# Patient Record
Sex: FEMALE | Race: OTHER | HISPANIC OR LATINO | URBAN - METROPOLITAN AREA
[De-identification: names, ages, dates, MRNs, and addresses within clinical notes are randomized per-mention and may not be internally consistent; named-entity substitution may affect disease eponyms.]

---

## 2017-04-28 ENCOUNTER — INPATIENT (INPATIENT)
Facility: HOSPITAL | Age: 37
LOS: 2 days | Discharge: HOME | End: 2017-05-01
Attending: OBSTETRICS & GYNECOLOGY | Admitting: OBSTETRICS & GYNECOLOGY
Payer: COMMERCIAL

## 2017-04-28 VITALS — HEIGHT: 61 IN | WEIGHT: 176.37 LBS

## 2017-04-28 DIAGNOSIS — Z34.80 ENCOUNTER FOR SUPERVISION OF OTHER NORMAL PREGNANCY, UNSPECIFIED TRIMESTER: ICD-10-CM

## 2017-04-28 LAB
APTT BLD: 24.2 SEC — LOW (ref 27.5–37.4)
BASOPHILS # BLD AUTO: 0.1 K/UL — SIGNIFICANT CHANGE UP (ref 0–0.2)
BASOPHILS NFR BLD AUTO: 1 % — SIGNIFICANT CHANGE UP (ref 0–2)
EOSINOPHIL # BLD AUTO: 0.1 K/UL — SIGNIFICANT CHANGE UP (ref 0–0.5)
EOSINOPHIL NFR BLD AUTO: 0.7 % — SIGNIFICANT CHANGE UP (ref 0–6)
FIBRINOGEN PPP-MCNC: 669 MG/DL — HIGH (ref 310–510)
HCT VFR BLD CALC: 38.6 % — SIGNIFICANT CHANGE UP (ref 34.5–45)
HGB BLD-MCNC: 12.5 G/DL — SIGNIFICANT CHANGE UP (ref 11.5–15.5)
INR BLD: 0.9 RATIO — SIGNIFICANT CHANGE UP (ref 0.88–1.16)
LYMPHOCYTES # BLD AUTO: 2.8 K/UL — SIGNIFICANT CHANGE UP (ref 1–3.3)
LYMPHOCYTES # BLD AUTO: 22.9 % — SIGNIFICANT CHANGE UP (ref 13–44)
MCHC RBC-ENTMCNC: 29.4 PG — SIGNIFICANT CHANGE UP (ref 27–34)
MCHC RBC-ENTMCNC: 32.4 GM/DL — SIGNIFICANT CHANGE UP (ref 32–36)
MCV RBC AUTO: 90.8 FL — SIGNIFICANT CHANGE UP (ref 80–100)
MONOCYTES # BLD AUTO: 0.9 K/UL — SIGNIFICANT CHANGE UP (ref 0–0.9)
MONOCYTES NFR BLD AUTO: 7.6 % — SIGNIFICANT CHANGE UP (ref 2–14)
NEUTROPHILS # BLD AUTO: 8.2 K/UL — HIGH (ref 1.8–7.4)
NEUTROPHILS NFR BLD AUTO: 67.8 % — SIGNIFICANT CHANGE UP (ref 43–77)
PLATELET # BLD AUTO: 215 K/UL — SIGNIFICANT CHANGE UP (ref 150–400)
PROTHROM AB SERPL-ACNC: 9.8 SEC — SIGNIFICANT CHANGE UP (ref 9.8–12.7)
RBC # BLD: 4.26 M/UL — SIGNIFICANT CHANGE UP (ref 3.8–5.2)
RBC # FLD: 14 % — SIGNIFICANT CHANGE UP (ref 10.3–14.5)
WBC # BLD: 12.1 K/UL — HIGH (ref 3.8–10.5)
WBC # FLD AUTO: 12.1 K/UL — HIGH (ref 3.8–10.5)

## 2017-04-28 RX ORDER — OXYTOCIN 10 UNIT/ML
2 VIAL (ML) INJECTION
Qty: 30 | Refills: 0 | Status: DISCONTINUED | OUTPATIENT
Start: 2017-04-28 | End: 2017-05-01

## 2017-04-28 RX ORDER — SODIUM CHLORIDE 9 MG/ML
1000 INJECTION, SOLUTION INTRAVENOUS ONCE
Qty: 0 | Refills: 0 | Status: DISCONTINUED | OUTPATIENT
Start: 2017-04-28 | End: 2017-04-29

## 2017-04-28 RX ORDER — CITRIC ACID/SODIUM CITRATE 300-500 MG
15 SOLUTION, ORAL ORAL EVERY 4 HOURS
Qty: 0 | Refills: 0 | Status: DISCONTINUED | OUTPATIENT
Start: 2017-04-28 | End: 2017-04-29

## 2017-04-28 RX ORDER — OXYTOCIN 10 UNIT/ML
333.33 VIAL (ML) INJECTION
Qty: 20 | Refills: 0 | Status: DISCONTINUED | OUTPATIENT
Start: 2017-04-28 | End: 2017-04-29

## 2017-04-28 RX ORDER — SODIUM CHLORIDE 9 MG/ML
1000 INJECTION, SOLUTION INTRAVENOUS
Qty: 0 | Refills: 0 | Status: DISCONTINUED | OUTPATIENT
Start: 2017-04-28 | End: 2017-04-29

## 2017-04-28 RX ADMIN — Medication 2 MILLIUNIT(S)/MIN: at 23:51

## 2017-04-28 RX ADMIN — SODIUM CHLORIDE 2000 MILLILITER(S): 9 INJECTION, SOLUTION INTRAVENOUS at 20:40

## 2017-04-29 LAB
BASOPHILS # BLD AUTO: 0.1 K/UL — SIGNIFICANT CHANGE UP (ref 0–0.2)
BASOPHILS NFR BLD AUTO: 0.5 % — SIGNIFICANT CHANGE UP (ref 0–2)
EOSINOPHIL # BLD AUTO: 0.1 K/UL — SIGNIFICANT CHANGE UP (ref 0–0.5)
EOSINOPHIL NFR BLD AUTO: 0.5 % — SIGNIFICANT CHANGE UP (ref 0–6)
HCT VFR BLD CALC: 33.2 % — LOW (ref 34.5–45)
HGB BLD-MCNC: 10.7 G/DL — LOW (ref 11.5–15.5)
LYMPHOCYTES # BLD AUTO: 15.6 % — SIGNIFICANT CHANGE UP (ref 13–44)
LYMPHOCYTES # BLD AUTO: 2.1 K/UL — SIGNIFICANT CHANGE UP (ref 1–3.3)
MCHC RBC-ENTMCNC: 29.3 PG — SIGNIFICANT CHANGE UP (ref 27–34)
MCHC RBC-ENTMCNC: 32.1 GM/DL — SIGNIFICANT CHANGE UP (ref 32–36)
MCV RBC AUTO: 91.1 FL — SIGNIFICANT CHANGE UP (ref 80–100)
MONOCYTES # BLD AUTO: 1.1 K/UL — HIGH (ref 0–0.9)
MONOCYTES NFR BLD AUTO: 8 % — SIGNIFICANT CHANGE UP (ref 2–14)
NEUTROPHILS # BLD AUTO: 10.2 K/UL — HIGH (ref 1.8–7.4)
NEUTROPHILS NFR BLD AUTO: 75.5 % — SIGNIFICANT CHANGE UP (ref 43–77)
PLATELET # BLD AUTO: 184 K/UL — SIGNIFICANT CHANGE UP (ref 150–400)
RBC # BLD: 3.65 M/UL — LOW (ref 3.8–5.2)
RBC # FLD: 14 % — SIGNIFICANT CHANGE UP (ref 10.3–14.5)
T PALLIDUM AB TITR SER: NEGATIVE — SIGNIFICANT CHANGE UP
WBC # BLD: 13.5 K/UL — HIGH (ref 3.8–10.5)
WBC # FLD AUTO: 13.5 K/UL — HIGH (ref 3.8–10.5)

## 2017-04-29 RX ORDER — OXYTOCIN 10 UNIT/ML
41.67 VIAL (ML) INJECTION
Qty: 20 | Refills: 0 | Status: DISCONTINUED | OUTPATIENT
Start: 2017-04-29 | End: 2017-05-01

## 2017-04-29 RX ORDER — TETANUS TOXOID, REDUCED DIPHTHERIA TOXOID AND ACELLULAR PERTUSSIS VACCINE, ADSORBED 5; 2.5; 8; 8; 2.5 [IU]/.5ML; [IU]/.5ML; UG/.5ML; UG/.5ML; UG/.5ML
0.5 SUSPENSION INTRAMUSCULAR ONCE
Qty: 0 | Refills: 0 | Status: DISCONTINUED | OUTPATIENT
Start: 2017-04-29 | End: 2017-05-01

## 2017-04-29 RX ORDER — DOCUSATE SODIUM 100 MG
100 CAPSULE ORAL
Qty: 0 | Refills: 0 | Status: DISCONTINUED | OUTPATIENT
Start: 2017-04-29 | End: 2017-05-01

## 2017-04-29 RX ORDER — ACETAMINOPHEN 500 MG
650 TABLET ORAL EVERY 6 HOURS
Qty: 0 | Refills: 0 | Status: DISCONTINUED | OUTPATIENT
Start: 2017-04-29 | End: 2017-05-01

## 2017-04-29 RX ORDER — SODIUM CHLORIDE 9 MG/ML
3 INJECTION INTRAMUSCULAR; INTRAVENOUS; SUBCUTANEOUS EVERY 8 HOURS
Qty: 0 | Refills: 0 | Status: DISCONTINUED | OUTPATIENT
Start: 2017-04-29 | End: 2017-05-01

## 2017-04-29 RX ORDER — AER TRAVELER 0.5 G/1
1 SOLUTION RECTAL; TOPICAL EVERY 4 HOURS
Qty: 0 | Refills: 0 | Status: DISCONTINUED | OUTPATIENT
Start: 2017-04-29 | End: 2017-05-01

## 2017-04-29 RX ORDER — DIPHENHYDRAMINE HCL 50 MG
25 CAPSULE ORAL EVERY 6 HOURS
Qty: 0 | Refills: 0 | Status: DISCONTINUED | OUTPATIENT
Start: 2017-04-29 | End: 2017-05-01

## 2017-04-29 RX ORDER — SIMETHICONE 80 MG/1
80 TABLET, CHEWABLE ORAL EVERY 6 HOURS
Qty: 0 | Refills: 0 | Status: DISCONTINUED | OUTPATIENT
Start: 2017-04-29 | End: 2017-05-01

## 2017-04-29 RX ORDER — IBUPROFEN 200 MG
600 TABLET ORAL EVERY 6 HOURS
Qty: 0 | Refills: 0 | Status: DISCONTINUED | OUTPATIENT
Start: 2017-04-29 | End: 2017-05-01

## 2017-04-29 RX ORDER — LANOLIN
1 OINTMENT (GRAM) TOPICAL EVERY 6 HOURS
Qty: 0 | Refills: 0 | Status: DISCONTINUED | OUTPATIENT
Start: 2017-04-29 | End: 2017-05-01

## 2017-04-29 RX ORDER — DIBUCAINE 1 %
1 OINTMENT (GRAM) RECTAL EVERY 4 HOURS
Qty: 0 | Refills: 0 | Status: DISCONTINUED | OUTPATIENT
Start: 2017-04-29 | End: 2017-05-01

## 2017-04-29 RX ORDER — PRAMOXINE HYDROCHLORIDE 150 MG/15G
1 AEROSOL, FOAM RECTAL EVERY 4 HOURS
Qty: 0 | Refills: 0 | Status: DISCONTINUED | OUTPATIENT
Start: 2017-04-29 | End: 2017-05-01

## 2017-04-29 RX ORDER — HYDROCORTISONE 1 %
1 OINTMENT (GRAM) TOPICAL EVERY 4 HOURS
Qty: 0 | Refills: 0 | Status: DISCONTINUED | OUTPATIENT
Start: 2017-04-29 | End: 2017-05-01

## 2017-04-29 RX ADMIN — Medication 125 MILLIUNIT(S)/MIN: at 02:36

## 2017-04-29 RX ADMIN — Medication 600 MILLIGRAM(S): at 18:46

## 2017-04-29 RX ADMIN — Medication 100 MILLIGRAM(S): at 18:44

## 2017-04-29 RX ADMIN — Medication 600 MILLIGRAM(S): at 19:50

## 2017-04-29 RX ADMIN — SODIUM CHLORIDE 3 MILLILITER(S): 9 INJECTION INTRAMUSCULAR; INTRAVENOUS; SUBCUTANEOUS at 14:37

## 2017-04-29 RX ADMIN — Medication 1 TABLET(S): at 12:50

## 2017-04-29 RX ADMIN — Medication 600 MILLIGRAM(S): at 09:39

## 2017-04-29 RX ADMIN — SODIUM CHLORIDE 3 MILLILITER(S): 9 INJECTION INTRAMUSCULAR; INTRAVENOUS; SUBCUTANEOUS at 22:01

## 2017-04-30 RX ADMIN — Medication 600 MILLIGRAM(S): at 16:18

## 2017-04-30 RX ADMIN — SODIUM CHLORIDE 3 MILLILITER(S): 9 INJECTION INTRAMUSCULAR; INTRAVENOUS; SUBCUTANEOUS at 14:00

## 2017-04-30 RX ADMIN — Medication 600 MILLIGRAM(S): at 04:33

## 2017-04-30 RX ADMIN — Medication 600 MILLIGRAM(S): at 05:44

## 2017-04-30 RX ADMIN — Medication 1 TABLET(S): at 12:09

## 2017-04-30 RX ADMIN — Medication 100 MILLIGRAM(S): at 07:26

## 2017-04-30 RX ADMIN — Medication 100 MILLIGRAM(S): at 17:07

## 2017-04-30 NOTE — DISCHARGE NOTE OB - ADDITIONAL INSTRUCTIONS
No sex, no pushing, no heavy lifting, no strenuous activity, Nothing per vagina x  6 weeks - no sex, tampons, douching, tub baths, etc. Continue breastfeeding.  Motrin as needed for pain. Follow up in office in 5-6 weeks for post partum check up. Please call for appt.

## 2017-04-30 NOTE — DISCHARGE NOTE OB - MATERIALS PROVIDED
Breastfeeding Mother’s Support Group Information/Guide to Postpartum Care/Shaken Baby Prevention Handout/Birth Certificate Instructions/Vaccinations/[facility]  Screening Program/Tdap Vaccination (VIS Pub Date: 2012)/MMR Vaccination (VIS Pub Date: 2012)/  Immunization Record/[facility] Hearing Screen Program

## 2017-04-30 NOTE — DISCHARGE NOTE OB - MEDICATION SUMMARY - MEDICATIONS TO TAKE
I will START or STAY ON the medications listed below when I get home from the hospital:    ibuprofen 600 mg oral tablet  -- 1 tab(s) by mouth every 6 hours  -- Do not take this drug if you are pregnant.  It is very important that you take or use this exactly as directed.  Do not skip doses or discontinue unless directed by your doctor.  May cause drowsiness or dizziness.  Obtain medical advice before taking any non-prescription drugs as some may affect the action of this medication.  Take with food or milk.    -- Indication: For pain    multivitamin, prenatal Prenatal Multivitamins with Folic Acid 1 mg oral capsule  -- 1 cap(s) by mouth once a day  -- Indication: For Supplement    ferrous sulfate 325 mg (65 mg elemental iron) oral tablet  -- 1 tab(s) by mouth 2 times a day  -- Check with your doctor before becoming pregnant.  Do not chew, break, or crush.  May discolor urine or feces.    -- Indication: For Supplement    Colace 100 mg oral capsule  -- 1 cap(s) by mouth 2 times a day  -- Medication should be taken with plenty of water.    -- Indication: For constipation

## 2017-04-30 NOTE — DISCHARGE NOTE OB - CARE PROVIDER_API CALL
Olanescu, Andrea D (MD), Obstetrics and Gynecology  [address] [address] [place] [address]  Phone: [phone]  Fax: [phone]

## 2017-04-30 NOTE — DISCHARGE NOTE OB - PLAN OF CARE
breast feeding and pain management Continue breastfeeding.  Motrin as needed for pain. Avoid strenuous activity, no heavy lifting. Nothing per vagina x 6 weeks - no sex, tampons, douching, tub baths, etc.  Follow up in office in 5-6 weeks for check up

## 2017-04-30 NOTE — DISCHARGE NOTE OB - CARE PLAN
Principal Discharge DX:	, delivered  Goal:	breast feeding and pain management  Instructions for follow-up, activity and diet:	Continue breastfeeding.  Motrin as needed for pain. Avoid strenuous activity, no heavy lifting. Nothing per vagina x 6 weeks - no sex, tampons, douching, tub baths, etc.  Follow up in office in 5-6 weeks for check up

## 2017-04-30 NOTE — DISCHARGE NOTE OB - PATIENT PORTAL LINK FT
“You can access the [facility] Patient Portal, offered by [facility], by registering with the following website: [email or web address]”

## 2017-04-30 NOTE — DISCHARGE NOTE OB - HOSPITAL COURSE
Patient arrived in labor, previous  section for breech requesting tolac  s/p vaginal birth after , routine post partum care  no issues, discharged post partum day 2 clinically stable

## 2017-05-01 VITALS
OXYGEN SATURATION: 98 % | TEMPERATURE: 98 F | SYSTOLIC BLOOD PRESSURE: 110 MMHG | DIASTOLIC BLOOD PRESSURE: 58 MMHG | HEART RATE: 73 BPM | RESPIRATION RATE: 16 BRPM

## 2017-05-01 PROCEDURE — G0463: CPT

## 2017-05-01 PROCEDURE — 86780 TREPONEMA PALLIDUM: CPT

## 2017-05-01 PROCEDURE — 59050 FETAL MONITOR W/REPORT: CPT

## 2017-05-01 PROCEDURE — 85027 COMPLETE CBC AUTOMATED: CPT

## 2017-05-01 PROCEDURE — 86901 BLOOD TYPING SEROLOGIC RH(D): CPT

## 2017-05-01 PROCEDURE — 59025 FETAL NON-STRESS TEST: CPT

## 2017-05-01 PROCEDURE — 86850 RBC ANTIBODY SCREEN: CPT

## 2017-05-01 PROCEDURE — 85730 THROMBOPLASTIN TIME PARTIAL: CPT

## 2017-05-01 PROCEDURE — 86920 COMPATIBILITY TEST SPIN: CPT

## 2017-05-01 PROCEDURE — 85610 PROTHROMBIN TIME: CPT

## 2017-05-01 PROCEDURE — 85384 FIBRINOGEN ACTIVITY: CPT

## 2017-05-01 PROCEDURE — 86900 BLOOD TYPING SEROLOGIC ABO: CPT

## 2017-05-01 PROCEDURE — 90707 MMR VACCINE SC: CPT

## 2017-05-01 RX ORDER — IBUPROFEN 200 MG
1 TABLET ORAL
Qty: 40 | Refills: 2 | OUTPATIENT
Start: 2017-05-01 | End: 2017-05-30

## 2017-05-01 RX ORDER — FERROUS SULFATE 325(65) MG
1 TABLET ORAL
Qty: 60 | Refills: 3 | OUTPATIENT
Start: 2017-05-01 | End: 2017-08-28

## 2017-05-01 RX ORDER — DOCUSATE SODIUM 100 MG
1 CAPSULE ORAL
Qty: 60 | Refills: 0 | OUTPATIENT
Start: 2017-05-01 | End: 2017-05-31

## 2017-05-01 RX ADMIN — Medication 600 MILLIGRAM(S): at 03:30

## 2017-05-01 RX ADMIN — Medication 600 MILLIGRAM(S): at 02:43

## 2017-05-01 RX ADMIN — Medication 1 TABLET(S): at 12:32

## 2017-05-01 NOTE — PROVIDER CONTACT NOTE (MEDICATION) - SITUATION
patient blood report for rubella  is non immune; educated the patient about the risks of not receiving the MMR vaccine and the VIS provided to her. But she refused to take the vaccine.

## 2017-05-08 DIAGNOSIS — O34.211 MATERNAL CARE FOR LOW TRANSVERSE SCAR FROM PREVIOUS CESAREAN DELIVERY: ICD-10-CM

## 2017-05-08 DIAGNOSIS — Z3A.38 38 WEEKS GESTATION OF PREGNANCY: ICD-10-CM

## 2017-05-08 DIAGNOSIS — Z34.83 ENCOUNTER FOR SUPERVISION OF OTHER NORMAL PREGNANCY, THIRD TRIMESTER: ICD-10-CM

## 2017-05-08 DIAGNOSIS — Z28.21 IMMUNIZATION NOT CARRIED OUT BECAUSE OF PATIENT REFUSAL: ICD-10-CM

## 2023-09-19 NOTE — PATIENT PROFILE OB - AS SC BRADEN MOISTURE
(4) rarely moist Staged Advancement Flap Text: The defect edges were debeveled with a #15 scalpel blade.  Given the location of the defect, shape of the defect and the proximity to free margins a staged advancement flap was deemed most appropriate.  Using a sterile surgical marker, an appropriate advancement flap was drawn incorporating the defect and placing the expected incisions within the relaxed skin tension lines where possible. The area thus outlined was incised deep to adipose tissue with a #15 scalpel blade.  The skin margins were undermined to an appropriate distance in all directions utilizing iris scissors.

## 2024-08-30 ENCOUNTER — APPOINTMENT (OUTPATIENT)
Dept: FAMILY MEDICINE | Facility: CLINIC | Age: 44
End: 2024-08-30

## 2024-09-09 ENCOUNTER — APPOINTMENT (OUTPATIENT)
Age: 44
End: 2024-09-09
Payer: COMMERCIAL

## 2024-09-09 VITALS
RESPIRATION RATE: 14 BRPM | OXYGEN SATURATION: 97 % | HEART RATE: 80 BPM | SYSTOLIC BLOOD PRESSURE: 120 MMHG | BODY MASS INDEX: 31.34 KG/M2 | HEIGHT: 61 IN | DIASTOLIC BLOOD PRESSURE: 83 MMHG | WEIGHT: 166 LBS | TEMPERATURE: 98.6 F

## 2024-09-09 DIAGNOSIS — Z00.00 ENCOUNTER FOR GENERAL ADULT MEDICAL EXAMINATION W/OUT ABNORMAL FINDINGS: ICD-10-CM

## 2024-09-09 DIAGNOSIS — D64.9 ANEMIA, UNSPECIFIED: ICD-10-CM

## 2024-09-09 PROCEDURE — 36415 COLL VENOUS BLD VENIPUNCTURE: CPT

## 2024-09-09 PROCEDURE — 99386 PREV VISIT NEW AGE 40-64: CPT

## 2024-09-09 RX ORDER — FERROUS GLUCONATE 256(28)MG
325 TABLET ORAL
Refills: 0 | Status: ACTIVE | COMMUNITY

## 2024-09-09 RX ORDER — UBIDECARENONE/VIT E ACET 100MG-5
CAPSULE ORAL
Refills: 0 | Status: ACTIVE | COMMUNITY

## 2024-09-09 RX ORDER — MULTIVIT-MIN/IRON/FOLIC ACID/K 18-600-40
CAPSULE ORAL
Refills: 0 | Status: ACTIVE | COMMUNITY

## 2024-09-09 NOTE — HEALTH RISK ASSESSMENT
[Good] : ~his/her~  mood as  good [Yes] : Yes [Monthly or less (1 pt)] : Monthly or less (1 point) [1 or 2 (0 pts)] : 1 or 2 (0 points) [Never (0 pts)] : Never (0 points) [No falls in past year] : Patient reported no falls in the past year [0] : 2) Feeling down, depressed, or hopeless: Not at all (0) [PHQ-2 Negative - No further assessment needed] : PHQ-2 Negative - No further assessment needed [Never] : Never [NO] : No [Patient reported PAP Smear was normal] : Patient reported PAP Smear was normal [HIV test declined] : HIV test declined [Hepatitis C test declined] : Hepatitis C test declined [None] : None [With Family] : lives with family [Unemployed] : unemployed [] :  [# Of Children ___] : has [unfilled] children [Sexually Active] : sexually active [Feels Safe at Home] : Feels safe at home [Fully functional (bathing, dressing, toileting, transferring, walking, feeding)] : Fully functional (bathing, dressing, toileting, transferring, walking, feeding) [Fully functional (using the telephone, shopping, preparing meals, housekeeping, doing laundry, using] : Fully functional and needs no help or supervision to perform IADLs (using the telephone, shopping, preparing meals, housekeeping, doing laundry, using transportation, managing medications and managing finances) [Travel to Developing Areas] : travel to developing areas [[id number]] : 0 [Change in mental status noted] : No change in mental status noted [High Risk Behavior] : no high risk behavior [Reports changes in hearing] : Reports no changes in hearing [Reports changes in vision] : Reports no changes in vision [Reports changes in dental health] : Reports no changes in dental health [TB Exposure] : is not being exposed to tuberculosis [PapSmearComments] : 1 yr ago [de-identified] :

## 2024-09-09 NOTE — PLAN
[FreeTextEntry1] : Fe def anemia- Fe, Ferritin, TIBC, CBC LT breast abn- FU for biopsy Heavy menses- FU with GYN

## 2024-09-09 NOTE — HISTORY OF PRESENT ILLNESS
[de-identified] : 44 years old female with history of Fe def anemia secondary to heavy menses presents for annual exam.  She reports following with GYN and had US to eval heavy menses.  She also went for mammogram that reveal Lt breast abn and will go for biopsy.

## 2024-09-13 ENCOUNTER — TRANSCRIPTION ENCOUNTER (OUTPATIENT)
Age: 44
End: 2024-09-13

## 2024-09-13 ENCOUNTER — APPOINTMENT (OUTPATIENT)
Age: 44
End: 2024-09-13
Payer: COMMERCIAL

## 2024-09-13 PROCEDURE — 36415 COLL VENOUS BLD VENIPUNCTURE: CPT

## 2024-09-19 ENCOUNTER — NON-APPOINTMENT (OUTPATIENT)
Age: 44
End: 2024-09-19

## 2024-09-20 LAB
ALBUMIN SERPL ELPH-MCNC: 4.4 G/DL
ALP BLD-CCNC: 75 U/L
ALT SERPL-CCNC: 17 U/L
ANION GAP SERPL CALC-SCNC: 12 MMOL/L
AST SERPL-CCNC: 18 U/L
BASOPHILS # BLD AUTO: 0.06 K/UL
BASOPHILS NFR BLD AUTO: 0.7 %
BILIRUB SERPL-MCNC: 0.2 MG/DL
BUN SERPL-MCNC: 8 MG/DL
CALCIUM SERPL-MCNC: 9.4 MG/DL
CHLORIDE SERPL-SCNC: 104 MMOL/L
CHOLEST SERPL-MCNC: 176 MG/DL
CO2 SERPL-SCNC: 24 MMOL/L
CREAT SERPL-MCNC: 0.79 MG/DL
EGFR: 95 ML/MIN/1.73M2
EOSINOPHIL # BLD AUTO: 0.3 K/UL
EOSINOPHIL NFR BLD AUTO: 3.7 %
ESTIMATED AVERAGE GLUCOSE: 108 MG/DL
FERRITIN SERPL-MCNC: 6 NG/ML
GLUCOSE SERPL-MCNC: 82 MG/DL
HBA1C MFR BLD HPLC: 5.4 %
HCT VFR BLD CALC: 39.1 %
HDLC SERPL-MCNC: 47 MG/DL
HGB BLD-MCNC: 11.5 G/DL
IMM GRANULOCYTES NFR BLD AUTO: 0.2 %
IRON SATN MFR SERPL: 8 %
IRON SERPL-MCNC: 33 UG/DL
LDLC SERPL CALC-MCNC: 107 MG/DL
LYMPHOCYTES # BLD AUTO: 2.9 K/UL
LYMPHOCYTES NFR BLD AUTO: 35.6 %
MAN DIFF?: NORMAL
MCHC RBC-ENTMCNC: 26.3 PG
MCHC RBC-ENTMCNC: 29.4 GM/DL
MCV RBC AUTO: 89.5 FL
MONOCYTES # BLD AUTO: 0.5 K/UL
MONOCYTES NFR BLD AUTO: 6.1 %
NEUTROPHILS # BLD AUTO: 4.36 K/UL
NEUTROPHILS NFR BLD AUTO: 53.7 %
NONHDLC SERPL-MCNC: 128 MG/DL
PLATELET # BLD AUTO: 317 K/UL
POTASSIUM SERPL-SCNC: 4.4 MMOL/L
PROT SERPL-MCNC: 7.4 G/DL
RBC # BLD: 4.37 M/UL
RBC # FLD: 18.1 %
SODIUM SERPL-SCNC: 140 MMOL/L
TIBC SERPL-MCNC: 442 UG/DL
TRIGL SERPL-MCNC: 122 MG/DL
TSH SERPL-ACNC: 2.85 UIU/ML
UIBC SERPL-MCNC: 409 UG/DL
WBC # FLD AUTO: 8.14 K/UL

## 2024-09-23 ENCOUNTER — NON-APPOINTMENT (OUTPATIENT)
Age: 44
End: 2024-09-23

## 2024-09-23 PROBLEM — N60.99 ATYPICAL LOBULAR HYPERPLASIA (ALH) OF BREAST: Status: ACTIVE | Noted: 2024-09-23

## 2024-09-24 ENCOUNTER — NON-APPOINTMENT (OUTPATIENT)
Age: 44
End: 2024-09-24

## 2024-09-24 ENCOUNTER — APPOINTMENT (OUTPATIENT)
Dept: SURGICAL ONCOLOGY | Facility: CLINIC | Age: 44
End: 2024-09-24
Payer: COMMERCIAL

## 2024-09-24 VITALS
HEART RATE: 80 BPM | WEIGHT: 164 LBS | DIASTOLIC BLOOD PRESSURE: 85 MMHG | SYSTOLIC BLOOD PRESSURE: 118 MMHG | RESPIRATION RATE: 17 BRPM | HEIGHT: 61 IN | BODY MASS INDEX: 30.96 KG/M2 | OXYGEN SATURATION: 99 %

## 2024-09-24 DIAGNOSIS — N60.99 UNSPECIFIED BENIGN MAMMARY DYSPLASIA OF UNSPECIFIED BREAST: ICD-10-CM

## 2024-09-24 DIAGNOSIS — R92.8 OTHER ABNORMAL AND INCONCLUSIVE FINDINGS ON DIAGNOSTIC IMAGING OF BREAST: ICD-10-CM

## 2024-09-24 PROCEDURE — 99203 OFFICE O/P NEW LOW 30 MIN: CPT

## 2024-09-24 NOTE — HISTORY OF PRESENT ILLNESS
[de-identified] : MIKE GRAMAJO is a 43 y/o F s/p core bx of left 2:00 calcifications found to be ALH, PASH. Here for initial consult.  Referred by: Dr. Jigna Noguera (OBGYN).   She denies any palpable breast masses, nipple discharge or skin changes.  Findings were discovered on routine breast screening.    PMHx: Iron deficiency anemia 2/2 heavy menstrual periods.  PSHx:  (). Left cheek mole removal at 11.  Meds: Ferrous 325 mg.  NKDA Family Hx: No family hx of breast or ovarian cancer.  GYN: , menarche age 11, LMP 24. Age at first pregnancy 18.  Y (1st 1- month, 2- week, 3 - 1 year)  No hx of OCPs.  Bra size: 36 C  Occupation: Unemployed.  Social: Smoking:  Denies       ETOH: Denies.

## 2024-09-24 NOTE — PHYSICAL EXAM
[Normal] : supple, no neck mass and thyroid not enlarged [Normal Neck Lymph Nodes] : normal neck lymph nodes  [Normal Supraclavicular Lymph Nodes] : normal supraclavicular lymph nodes [Normal Axillary Lymph Nodes] : normal axillary lymph nodes [Normal] : oriented to person, place and time, with appropriate affect [de-identified] : no palpable masses in either breast.  no clinical lymphadenopathy [de-identified] : Normal respiratory effort

## 2024-09-24 NOTE — HISTORY OF PRESENT ILLNESS
[de-identified] : MIKE GRAMAJO is a 45 y/o F s/p core bx of left 2:00 calcifications found to be ALH, PASH. Here for initial consult.  Referred by: Dr. Jigna Noguera (OBGYN).   She denies any palpable breast masses, nipple discharge or skin changes.  Findings were discovered on routine breast screening.    PMHx: Iron deficiency anemia 2/2 heavy menstrual periods.  PSHx:  (). Left cheek mole removal at 11.  Meds: Ferrous 325 mg.  NKDA Family Hx: No family hx of breast or ovarian cancer.  GYN: , menarche age 11, LMP 24. Age at first pregnancy 18.  Y (1st 1- month, 2- week, 3 - 1 year)  No hx of OCPs.  Bra size: 36 C  Occupation: Unemployed.  Social: Smoking:  Denies       ETOH: Denies.

## 2024-09-24 NOTE — CONSULT LETTER
[Dear  ___] : Dear  [unfilled], [Consult Letter:] : I had the pleasure of evaluating your patient, [unfilled]. [Please see my note below.] : Please see my note below. [Consult Closing:] : Thank you very much for allowing me to participate in the care of this patient.  If you have any questions, please do not hesitate to contact me. [Sincerely,] : Sincerely, [FreeTextEntry3] : Dr. Sylvia Reyes  Breast Surgical oncology.

## 2024-09-24 NOTE — ASSESSMENT
[FreeTextEntry1] : MIKE GRAMAJO is a 45 y/o F s/p core bx of left 2:00 calcifications found to be ALH, PASH. Here for initial consult.   We discussed the pathologic diagnosis of ALH in detail. Atypical Lobular Hyperplasia (ALH) is associated with an increased risk of breast cancer, and when identified on core needle biopsy, may be associated with neighboring malignancy or may even be difficult to differentiate from low volume ductal carcinoma in-situ (DCIS).  Management options include interval imaging surveillance vs surgical excision.  Will obtain MRI for assessment of biopsy site and high risk screening.  RTO 1-2 weeks.

## 2024-09-24 NOTE — PHYSICAL EXAM
[Normal] : supple, no neck mass and thyroid not enlarged [Normal Neck Lymph Nodes] : normal neck lymph nodes  [Normal Supraclavicular Lymph Nodes] : normal supraclavicular lymph nodes [Normal Axillary Lymph Nodes] : normal axillary lymph nodes [Normal] : oriented to person, place and time, with appropriate affect [de-identified] : no palpable masses in either breast.  no clinical lymphadenopathy [de-identified] : Normal respiratory effort

## 2024-09-24 NOTE — HISTORY OF PRESENT ILLNESS
[de-identified] : MIKE GRAMAJO is a 45 y/o F s/p core bx of left 2:00 calcifications found to be ALH, PASH. Here for initial consult.  Referred by: Dr. Jigna Noguera (OBGYN).   She denies any palpable breast masses, nipple discharge or skin changes.  Findings were discovered on routine breast screening.    PMHx: Iron deficiency anemia 2/2 heavy menstrual periods.  PSHx:  (). Left cheek mole removal at 11.  Meds: Ferrous 325 mg.  NKDA Family Hx: No family hx of breast or ovarian cancer.  GYN: , menarche age 11, LMP 24. Age at first pregnancy 18.  Y (1st 1- month, 2- week, 3 - 1 year)  No hx of OCPs.  Bra size: 36 C  Occupation: Unemployed.  Social: Smoking:  Denies       ETOH: Denies.

## 2024-09-24 NOTE — RESULTS/DATA
[FreeTextEntry1] : BREAST PATH/RAD REVIEW.  [facility].  12/26/23 BL SC MG/US (TC 9.05%): birads 0. HD.  - R medial superior cluster of calcs (Rec R Dx MG)  - R medial superior area of asymmetry, L upper quadrant area of asymmetry (Rec BL Dx US, unless patient can obtain prior films demonstrating stability).  - B/L cysts/ complicated cysts.   2/5/24 BL Dx MG/US: birads 3. HD.  - R 1-2:00 middle depth probably benign grouped microcalcs.  - R 3:00 middle depth persistent multilobulated mass Vs cluster of masses - with questionable sono correlate at R 2N4 2.1 cm - R 2N6 focal duct ectasia, R 9N3 0.6 cm septated complicated cyst.  (Rec 6- month f/u BL Dx MG/US).   8/20/24 BL Dx MG/US; birads 4. HD.  - R medial layering/round calcs associated w/ a stable 1.9 cm focal asymmetry. Focal asymmetry corresponds to ductal ectasia/complicated cyst sonographically.  - L 2:00 grouped amorphous calcs (Rec Stereo bx)   9/10/24 Left [UOQ w/ calcs]: ALH w/ scattered microcalcs. PASH. Calcs in associated w/ microcysts and lobules.  Hourglass clip. Atypical and High-risk. Concordant.  No residual calcs seen post-biopsy. (Rec Surg c/s).

## 2024-10-02 ENCOUNTER — APPOINTMENT (OUTPATIENT)
Dept: MRI IMAGING | Facility: CLINIC | Age: 44
End: 2024-10-02
Payer: COMMERCIAL

## 2024-10-02 PROCEDURE — A9585: CPT

## 2024-10-02 PROCEDURE — 77049 MRI BREAST C-+ W/CAD BI: CPT

## 2024-10-03 ENCOUNTER — RESULT REVIEW (OUTPATIENT)
Age: 44
End: 2024-10-03

## 2024-10-03 PROBLEM — R92.8 ABNORMAL FINDING ON BREAST IMAGING: Status: ACTIVE | Noted: 2024-10-03

## 2024-10-11 ENCOUNTER — APPOINTMENT (OUTPATIENT)
Dept: MRI IMAGING | Facility: CLINIC | Age: 44
End: 2024-10-11
Payer: COMMERCIAL

## 2024-10-11 ENCOUNTER — RESULT REVIEW (OUTPATIENT)
Age: 44
End: 2024-10-11

## 2024-10-11 PROCEDURE — 77065 DX MAMMO INCL CAD UNI: CPT | Mod: RT

## 2024-10-11 PROCEDURE — 19085Z: CUSTOM | Mod: RT

## 2024-10-11 PROCEDURE — A9585: CPT

## 2024-10-17 ENCOUNTER — APPOINTMENT (OUTPATIENT)
Dept: SURGICAL ONCOLOGY | Facility: CLINIC | Age: 44
End: 2024-10-17
Payer: COMMERCIAL

## 2024-10-17 VITALS
HEIGHT: 61 IN | DIASTOLIC BLOOD PRESSURE: 82 MMHG | WEIGHT: 164 LBS | BODY MASS INDEX: 30.96 KG/M2 | SYSTOLIC BLOOD PRESSURE: 123 MMHG | OXYGEN SATURATION: 97 % | HEART RATE: 66 BPM

## 2024-10-17 DIAGNOSIS — N60.99 UNSPECIFIED BENIGN MAMMARY DYSPLASIA OF UNSPECIFIED BREAST: ICD-10-CM

## 2024-10-17 DIAGNOSIS — D24.9 BENIGN NEOPLASM OF UNSPECIFIED BREAST: ICD-10-CM

## 2024-10-17 PROCEDURE — 99214 OFFICE O/P EST MOD 30 MIN: CPT

## 2024-11-08 NOTE — DISCHARGE NOTE OB - ADMISSION DATE +STARTOFVISITDATE
-Patient complains of hirsutism and oligomenorrhea. Will evaluate for other endocrine disorders that can mimic PCOS that cause oligomenorrhea and/or h???r???michelle?nism, such as thyroid disease, nonclassic congenital adrenal hyperplasia (NCCAH), h???r?r?l??tinemia, and androgen-secreting tumors. Although menstrual dysfunction will almost always be due to ?CO? or, in some cases, NCCAH due to 21-hydroxylase deficiency, the possibilities of pregnancy, h???r?r?l??ti?emia, primary ovarian insufficiency, and thyroid disease should not be overlooked.  -Will order total testosterone, prolactin, and early morning 17-hydroxyprogesterone.  -Will check DHEAS to rule out adrenocortical carcinoma.  -Due to oligomenorrhea, will order hCG TSH and FSH to rule out other causes of irregular menstruation.     Statement Selected